# Patient Record
Sex: MALE | Race: OTHER
[De-identification: names, ages, dates, MRNs, and addresses within clinical notes are randomized per-mention and may not be internally consistent; named-entity substitution may affect disease eponyms.]

---

## 2024-05-29 ENCOUNTER — LABORATORY RESULT (OUTPATIENT)
Age: 34
End: 2024-05-29

## 2024-05-29 ENCOUNTER — RESULT REVIEW (OUTPATIENT)
Age: 34
End: 2024-05-29

## 2024-05-29 ENCOUNTER — OUTPATIENT (OUTPATIENT)
Dept: OUTPATIENT SERVICES | Facility: HOSPITAL | Age: 34
LOS: 1 days | End: 2024-05-29
Payer: COMMERCIAL

## 2024-05-29 ENCOUNTER — APPOINTMENT (OUTPATIENT)
Dept: SURGERY | Facility: CLINIC | Age: 34
End: 2024-05-29
Payer: COMMERCIAL

## 2024-05-29 ENCOUNTER — TRANSCRIPTION ENCOUNTER (OUTPATIENT)
Age: 34
End: 2024-05-29

## 2024-05-29 VITALS
HEART RATE: 96 BPM | WEIGHT: 212 LBS | SYSTOLIC BLOOD PRESSURE: 158 MMHG | BODY MASS INDEX: 28.71 KG/M2 | HEIGHT: 72 IN | OXYGEN SATURATION: 98 % | DIASTOLIC BLOOD PRESSURE: 83 MMHG | TEMPERATURE: 98.4 F

## 2024-05-29 DIAGNOSIS — Z80.9 FAMILY HISTORY OF MALIGNANT NEOPLASM, UNSPECIFIED: ICD-10-CM

## 2024-05-29 DIAGNOSIS — Z78.9 OTHER SPECIFIED HEALTH STATUS: ICD-10-CM

## 2024-05-29 DIAGNOSIS — K42.9 UMBILICAL HERNIA W/OUT OBSTRUCTION OR GANGRENE: ICD-10-CM

## 2024-05-29 DIAGNOSIS — I10 ESSENTIAL (PRIMARY) HYPERTENSION: ICD-10-CM

## 2024-05-29 DIAGNOSIS — Z82.49 FAMILY HISTORY OF ISCHEMIC HEART DISEASE AND OTHER DISEASES OF THE CIRCULATORY SYSTEM: ICD-10-CM

## 2024-05-29 PROBLEM — Z00.00 ENCOUNTER FOR PREVENTIVE HEALTH EXAMINATION: Status: ACTIVE | Noted: 2024-05-29

## 2024-05-29 PROCEDURE — 99204 OFFICE O/P NEW MOD 45 MIN: CPT

## 2024-05-29 PROCEDURE — 71046 X-RAY EXAM CHEST 2 VIEWS: CPT | Mod: 26

## 2024-05-29 PROCEDURE — 71046 X-RAY EXAM CHEST 2 VIEWS: CPT

## 2024-05-29 RX ORDER — AMLODIPINE BESYLATE 5 MG/1
5 TABLET ORAL
Refills: 0 | Status: ACTIVE | COMMUNITY

## 2024-05-31 NOTE — ADDENDUM
[FreeTextEntry1] : In summary, Mr. CHUCKIE WALTERS has a symptomatic umbilical hernia for which I recommend surgical repair at the patient's earliest convenience. I discussed the risks and benefits including, but not limited to, injury to intra-abdominal organs, bleeding, infection, use of mesh and recurrent hernia. We discussed the role and complications of mesh at length. I also discussed the normal chi- and post-operative course. I answered all questions about this surgery and possible complications to the best of my ability and the patient verbalized understanding. We discussed the possible need for drains as well. Should unrelenting symptoms or signs of incarceration develop prior to surgery the patient knows to call or go to the emergency room.  Plan for robotic assisted umbilical hernia repair with mesh Pre op EKG, CXR, labs  I, Dr. Darian Price, spent 50 minutes with the patient >50% counseling/coordination of care including, reviewing the patient's history, performing an examination, reviewing relevant labs and radiographic imaging, reviewing PCP and consultant notes, discussion of medical and surgical management of the diagnosis as well as associated risks and benefits, and completing documentation.

## 2024-05-31 NOTE — ASSESSMENT
[FreeTextEntry1] : Pt is a 34 y/o M with pmhx of HTN who presents today feeling well here for evaluation of umbilical hernia. Pt states he first noticed the hernia about 1 year ago and states the hernia has gradually been increasing in size. Pt denies any past sxhx. Denies any smoking, drug use, reports social alcohol use. Pt states she usually is very physically active but has eliminated strength training and he is hoping to return to full exercise capacity. On PE,4cm umbilical hernia appreciated. All of the risks, benefits, and alternatives to the proposed procedure were explained to the pt in great detail and pt wishes to proceed with sx scheduling for umbilical hernia repair.

## 2024-05-31 NOTE — HISTORY OF PRESENT ILLNESS
[de-identified] : Pt is a 34 y/o M with pmhx of HTN who presents today feeling well here for evaluation of umbilical hernia. Pt states he first noticed the hernia about 1 year ago and states the hernia has gradually been increasing in size. Pt denies any past sxhx. Denies any smoking, drug use, reports social alcohol use. Pt states she usually is very physically active but has eliminated strength training and he is hoping to return to full exercise capacity. On PE,4cm umbilical hernia appreciated. All of the risks, benefits, and alternatives to the proposed procedure were explained to the pt in great detail and pt wishes to proceed with sx scheduling for umbilical hernia repair.

## 2024-07-08 ENCOUNTER — TRANSCRIPTION ENCOUNTER (OUTPATIENT)
Age: 34
End: 2024-07-08

## 2024-07-08 VITALS
OXYGEN SATURATION: 97 % | RESPIRATION RATE: 16 BRPM | TEMPERATURE: 98 F | WEIGHT: 210.1 LBS | DIASTOLIC BLOOD PRESSURE: 87 MMHG | HEIGHT: 72 IN | SYSTOLIC BLOOD PRESSURE: 138 MMHG | HEART RATE: 93 BPM

## 2024-07-08 LAB
ALBUMIN SERPL ELPH-MCNC: 4.7 G/DL
ALP BLD-CCNC: 78 U/L
ALT SERPL-CCNC: 21 U/L
ANION GAP SERPL CALC-SCNC: 10 MMOL/L
AST SERPL-CCNC: 27 U/L
BILIRUB SERPL-MCNC: 0.4 MG/DL
BUN SERPL-MCNC: 15 MG/DL
CALCIUM SERPL-MCNC: 9.4 MG/DL
CHLORIDE SERPL-SCNC: 103 MMOL/L
CO2 SERPL-SCNC: 26 MMOL/L
CREAT SERPL-MCNC: 1.02 MG/DL
EGFR: 98 ML/MIN/1.73M2
GLUCOSE SERPL-MCNC: 106 MG/DL
INR PPP: 0.89
POTASSIUM SERPL-SCNC: 4.3 MMOL/L
PROT SERPL-MCNC: 7.1 G/DL
PT BLD: 10.2 SEC
SODIUM SERPL-SCNC: 139 MMOL/L

## 2024-07-08 RX ORDER — CABOTEGRAVIR AND RILPIVIRINE 600-900/3
0 KIT INTRAMUSCULAR
Refills: 0 | DISCHARGE

## 2024-07-08 RX ORDER — AMLODIPINE BESYLATE 2.5 MG/1
1 TABLET ORAL
Refills: 0 | DISCHARGE

## 2024-07-08 NOTE — ASU PATIENT PROFILE, ADULT - FALL HARM RISK - UNIVERSAL INTERVENTIONS
Bed in lowest position, wheels locked, appropriate side rails in place/Call bell, personal items and telephone in reach/Instruct patient to call for assistance before getting out of bed or chair/Non-slip footwear when patient is out of bed/[place] to call system/Physically safe environment - no spills, clutter or unnecessary equipment/Purposeful Proactive Rounding/Room/bathroom lighting operational, light cord in reach

## 2024-07-09 ENCOUNTER — TRANSCRIPTION ENCOUNTER (OUTPATIENT)
Age: 34
End: 2024-07-09

## 2024-07-09 ENCOUNTER — RESULT REVIEW (OUTPATIENT)
Age: 34
End: 2024-07-09

## 2024-07-09 ENCOUNTER — INPATIENT (INPATIENT)
Facility: HOSPITAL | Age: 34
LOS: 0 days | Discharge: HOME | DRG: 354 | End: 2024-07-10
Attending: STUDENT IN AN ORGANIZED HEALTH CARE EDUCATION/TRAINING PROGRAM | Admitting: STUDENT IN AN ORGANIZED HEALTH CARE EDUCATION/TRAINING PROGRAM
Payer: COMMERCIAL

## 2024-07-09 ENCOUNTER — APPOINTMENT (OUTPATIENT)
Dept: BARIATRICS | Facility: HOSPITAL | Age: 34
End: 2024-07-09

## 2024-07-09 DIAGNOSIS — K08.409 PARTIAL LOSS OF TEETH, UNSPECIFIED CAUSE, UNSPECIFIED CLASS: Chronic | ICD-10-CM

## 2024-07-09 PROCEDURE — 49593 RPR AA HRN 1ST 3-10 RDC: CPT

## 2024-07-09 PROCEDURE — 88302 TISSUE EXAM BY PATHOLOGIST: CPT | Mod: 26

## 2024-07-09 PROCEDURE — 93010 ELECTROCARDIOGRAM REPORT: CPT

## 2024-07-09 PROCEDURE — S2900 ROBOTIC SURGICAL SYSTEM: CPT | Mod: NC

## 2024-07-09 PROCEDURE — 49593 RPR AA HRN 1ST 3-10 RDC: CPT | Mod: AS

## 2024-07-09 DEVICE — MESH HERNIA VENTRAL VENTRALIGHT ST ELLIPSE 4 X 6": Type: IMPLANTABLE DEVICE | Status: FUNCTIONAL

## 2024-07-09 RX ORDER — OXYCODONE HYDROCHLORIDE 100 MG/5ML
1 SOLUTION ORAL
Qty: 12 | Refills: 0
Start: 2024-07-09 | End: 2024-07-11

## 2024-07-09 RX ORDER — ACETAMINOPHEN 325 MG
1000 TABLET ORAL EVERY 6 HOURS
Refills: 0 | Status: DISCONTINUED | OUTPATIENT
Start: 2024-07-09 | End: 2024-07-10

## 2024-07-09 RX ORDER — ONDANSETRON HYDROCHLORIDE 2 MG/ML
4 INJECTION INTRAMUSCULAR; INTRAVENOUS EVERY 6 HOURS
Refills: 0 | Status: DISCONTINUED | OUTPATIENT
Start: 2024-07-09 | End: 2024-07-10

## 2024-07-09 RX ORDER — OXYCODONE HYDROCHLORIDE 100 MG/5ML
5 SOLUTION ORAL EVERY 6 HOURS
Refills: 0 | Status: DISCONTINUED | OUTPATIENT
Start: 2024-07-09 | End: 2024-07-10

## 2024-07-09 RX ORDER — HYDROMORPHONE HCL 0.2 MG/ML
0.5 INJECTION, SOLUTION INTRAVENOUS
Refills: 0 | Status: DISCONTINUED | OUTPATIENT
Start: 2024-07-09 | End: 2024-07-09

## 2024-07-09 RX ORDER — DOCUSATE SODIUM 100 MG/1
1 CAPSULE, LIQUID FILLED ORAL
Qty: 14 | Refills: 0
Start: 2024-07-09 | End: 2024-07-15

## 2024-07-09 RX ADMIN — OXYCODONE HYDROCHLORIDE 5 MILLIGRAM(S): 100 SOLUTION ORAL at 19:14

## 2024-07-09 RX ADMIN — HYDROMORPHONE HCL 0.5 MILLIGRAM(S): 0.2 INJECTION, SOLUTION INTRAVENOUS at 17:50

## 2024-07-09 RX ADMIN — HYDROMORPHONE HCL 0.5 MILLIGRAM(S): 0.2 INJECTION, SOLUTION INTRAVENOUS at 17:33

## 2024-07-09 RX ADMIN — Medication 1000 MILLIGRAM(S): at 23:12

## 2024-07-09 RX ADMIN — OXYCODONE HYDROCHLORIDE 5 MILLIGRAM(S): 100 SOLUTION ORAL at 20:14

## 2024-07-09 RX ADMIN — HYDROMORPHONE HCL 0.5 MILLIGRAM(S): 0.2 INJECTION, SOLUTION INTRAVENOUS at 16:56

## 2024-07-09 RX ADMIN — HYDROMORPHONE HCL 0.5 MILLIGRAM(S): 0.2 INJECTION, SOLUTION INTRAVENOUS at 17:29

## 2024-07-09 NOTE — ASU DISCHARGE PLAN (ADULT/PEDIATRIC) - CARE PROVIDER_API CALL
Darian Price  Surgery  [address], [address]  [place] [address]  Phone: [phone]  Fax: [phone]  Follow Up Time:

## 2024-07-09 NOTE — BRIEF OPERATIVE NOTE - NSICDXBRIEFPROCEDURE_GEN_ALL_CORE_FT
PROCEDURES:  Repair, hernia, reducible, abdominal wall, anterior, without history of prior repair, 3 cm to 10 cm, with mesh insertion 09-Jul-2024 16:25:16  Nancy Flores

## 2024-07-09 NOTE — PROGRESS NOTE ADULT - SUBJECTIVE AND OBJECTIVE BOX
General Surgery Post-Op Check    Patient seen and examined without acute complaints. Pt states lower abdominal pain, but controlled with pain medication. Pt with tachycardia and hypertension, which is normalized with administration of pain medication. Pt has voided 300cc. Denies SOB/CP/N/V.     Vital Signs Last 24 Hrs  T(C): 37.6 (09 Jul 2024 19:27), Max: 37.6 (09 Jul 2024 19:27)  T(F): 99.7 (09 Jul 2024 19:27), Max: 99.7 (09 Jul 2024 19:27)  HR: 118 (09 Jul 2024 20:07) (84 - 129)  BP: 155/89 (09 Jul 2024 20:07) (143/82 - 155/89)  BP(mean): 116 (09 Jul 2024 20:07) (103 - 118)  RR: 18 (09 Jul 2024 20:07) (16 - 24)  SpO2: 95% (09 Jul 2024 20:07) (95% - 100%)    Parameters below as of 09 Jul 2024 20:07  Patient On (Oxygen Delivery Method): room air        I&O's Summary    09 Jul 2024 07:01  -  09 Jul 2024 20:44  --------------------------------------------------------  IN: 980 mL / OUT: 500 mL / NET: 480 mL        Physical Exam  Gen: NAD, AAOx3  Pulm: No respiratory distress, no subcostal retractions  CV: RRR, no JVD  Abd: Soft, mildly tender to palpation in RLQ and LLQ, ND, incisions c/d/i  Extremities:  FROM, warm and well perfused, equal bilateral muscle strength      A/P: 33y Male s/p RA umbilical hernia repair    Continue monitoring VS  Discharge           General Surgery Post-Op Check    Patient seen and examined without acute complaints. Pt states lower abdominal pain, but controlled with pain medication. Pt with tachycardia and hypertension, which is normalized with administration of pain medication. Pt has voided 300cc. Denies SOB/CP/N/V.     Vital Signs Last 24 Hrs  T(C): 37.6 (09 Jul 2024 19:27), Max: 37.6 (09 Jul 2024 19:27)  T(F): 99.7 (09 Jul 2024 19:27), Max: 99.7 (09 Jul 2024 19:27)  HR: 118 (09 Jul 2024 20:07) (84 - 129)  BP: 155/89 (09 Jul 2024 20:07) (143/82 - 155/89)  BP(mean): 116 (09 Jul 2024 20:07) (103 - 118)  RR: 18 (09 Jul 2024 20:07) (16 - 24)  SpO2: 95% (09 Jul 2024 20:07) (95% - 100%)    Parameters below as of 09 Jul 2024 20:07  Patient On (Oxygen Delivery Method): room air        I&O's Summary    09 Jul 2024 07:01  -  09 Jul 2024 20:44  --------------------------------------------------------  IN: 980 mL / OUT: 500 mL / NET: 480 mL        Physical Exam  Gen: NAD, AAOx3  Pulm: No respiratory distress, no subcostal retractions  CV: RRR, no JVD  Abd: Soft, mildly tender to palpation in RLQ and LLQ, ND, incisions c/d/i  Extremities:  FROM, warm and well perfused, equal bilateral muscle strength      A/P: 33y Male s/p RA umbilical hernia repair      Admit to 23HR Obs  Regular diet  Pain/Nausea control PRN  SCDs/IS/OOB  Continue monitoring VS

## 2024-07-09 NOTE — ASU DISCHARGE PLAN (ADULT/PEDIATRIC) - ASU DC SPECIAL INSTRUCTIONSFT
Please follow up with Dr. Price in one week; you may call the office to make an appointment at your earliest convenience.    Take 2 tabs tylenol every 6 hours as needed for pain management, do not exceed 4,000mg of tylenol in 24 hours.. You have also been prescribed oxycodone for pain not controlled by tylenol. Take 1 tabs as prescribed for severe pain. Take prescribed stool softener (colace) to prevent constipation caused by oxycodone.     Common side effects of New medications:  - Common side effects of tylenol: nausea, stomach upset, loss of appetite, constipation   - Common side effects of oxycodone include: nausea, constipation, stomach discomfort, drowsiness  - Common side effects of colace: nausea, diarrhea, stomach upset/cramping    General Discharge Instructions:  Please resume all regular home medications unless specifically advised not to take a particular medication. Also, please take any new medications as prescribed.  Please get plenty of rest, continue to ambulate several times per day, and drink adequate amounts of fluids. Avoid lifting weights greater than 5-10 lbs until you follow-up with your surgeon, who will instruct you further regarding activity restrictions.  Avoid driving or operating heavy machinery while taking pain medications.  Please follow-up with your surgeon and Primary Care Provider (PCP) as advised.  Incision Care:  *Please call your doctor if you have increased pain, swelling, redness, or drainage from the incision site.  *Avoid swimming and baths until your follow-up appointment.  *You may shower, and wash surgical incisions with a mild soap and warm water. Gently pat the area dry.    Warning Signs:  Please call your doctor if you experience the following:  *You experience new chest pain, pressure, squeezing or tightness.  *New or worsening cough, shortness of breath, or wheeze.  *If you are vomiting and cannot keep down fluids or your medications.  *You are getting dehydrated due to continued vomiting, diarrhea, or other reasons. Signs of dehydration include dry mouth, rapid heartbeat, or feeling dizzy or faint when standing.  *You see blood or dark/black material when you vomit or have a bowel movement.  *You experience burning when you urinate, have blood in your urine, or experience a discharge.  *Your pain is not improving within 8-12 hours or is not gone within 24 hours. Call or return immediately if your pain is getting worse, changes location, or moves to your chest or back.  *You have shaking chills, or fever greater than 101.5 degrees Fahrenheit or 38 degrees Celsius.  *Any change in your symptoms, or any new symptoms that concern you.

## 2024-07-09 NOTE — BRIEF OPERATIVE NOTE - OPERATION/FINDINGS
Veress insufflation. Robot ports placed along left abdominal wall. Umbilical hernia noted. Peritoneal flap created, hernia reduced. Defect closed with 1 stratafix, 8cm x 12cm ventralight mesh tacked into place with 3-0 vloc, peritioneal flap closed with 3-0 vloc. Robot 12 port site closed with 0 vicryl with endoclose. Abdomen inspected, hemostasis achieved.

## 2024-07-10 ENCOUNTER — TRANSCRIPTION ENCOUNTER (OUTPATIENT)
Age: 34
End: 2024-07-10

## 2024-07-10 VITALS
TEMPERATURE: 98 F | SYSTOLIC BLOOD PRESSURE: 141 MMHG | HEART RATE: 87 BPM | DIASTOLIC BLOOD PRESSURE: 84 MMHG | OXYGEN SATURATION: 96 % | RESPIRATION RATE: 18 BRPM

## 2024-07-10 PROCEDURE — 86850 RBC ANTIBODY SCREEN: CPT

## 2024-07-10 PROCEDURE — C1781: CPT

## 2024-07-10 PROCEDURE — 86900 BLOOD TYPING SEROLOGIC ABO: CPT

## 2024-07-10 PROCEDURE — S2900: CPT

## 2024-07-10 PROCEDURE — 86901 BLOOD TYPING SEROLOGIC RH(D): CPT

## 2024-07-10 PROCEDURE — 93005 ELECTROCARDIOGRAM TRACING: CPT

## 2024-07-10 RX ADMIN — OXYCODONE HYDROCHLORIDE 5 MILLIGRAM(S): 100 SOLUTION ORAL at 01:59

## 2024-07-10 RX ADMIN — OXYCODONE HYDROCHLORIDE 5 MILLIGRAM(S): 100 SOLUTION ORAL at 08:11

## 2024-07-10 RX ADMIN — OXYCODONE HYDROCHLORIDE 5 MILLIGRAM(S): 100 SOLUTION ORAL at 09:10

## 2024-07-10 RX ADMIN — Medication 1000 MILLIGRAM(S): at 00:12

## 2024-07-10 NOTE — PROGRESS NOTE ADULT - ASSESSMENT
33M with pmhx of HTN, HIV and no pshx now s/p Robotic assisted lap umbilical hernia repair    23hr obs  reg diet  pain/nausea control PRN  SCDs/IS/OOB

## 2024-07-10 NOTE — DISCHARGE NOTE NURSING/CASE MANAGEMENT/SOCIAL WORK - NSDCPEFALRISK_GEN_ALL_CORE
For information on Fall & Injury Prevention, visit: [email or web address] OR  [email or web address] OR  https://www.cdc.gov/steadi/patient.html

## 2024-07-10 NOTE — PATIENT PROFILE ADULT - FUNCTIONAL ASSESSMENT - BASIC MOBILITY 1.
Spoke with Jose regards to their up coming procedure scheduled for 8/17. [facility] 1300. He will remain NPO after midnight the night before the procedure and will take morning meds with small sip of water. He will need to hold metformin pre procedure.    I also informed them of the change in the visitor policy and that one person is allowed to come with them. I asked that they and their visitor wear a mask to the hospital and if they do not have one, one will be given to them at the front door. They are also aware that they and their visitor will be screened for Covid upon arrival at the hospital. He will be given conscious sedation for the procedure and therefore will need to have a . No further questions at this time.Gabe Guthrie RN       4 = No assist / stand by assistance

## 2024-07-10 NOTE — DISCHARGE NOTE NURSING/CASE MANAGEMENT/SOCIAL WORK - PATIENT PORTAL LINK FT
You can access the FollowMyHealth Patient Portal offered by [facility] by registering at the following website: [email or web address]. By joining [facility]’s FollowMyHealth portal, you will also be able to view your health information using other applications (apps) compatible with our system.

## 2024-07-10 NOTE — PROGRESS NOTE ADULT - SUBJECTIVE AND OBJECTIVE BOX
INTERVAL HPI/OVERNIGHT EVENTS: Admitted for 23 hr obs, 11:30PM VS /93, , asx  7/9: RA umbilical hernia repair, POC still hypertensive and tachycardic, normalizes with pain meds to 120-130s, hr 115, pTOV    STATUS POST:  Robotic assisted  laparoscopic  umbilical hernia repair        POST OPERATIVE DAY #: 1    SUBJECTIVE: Patient examined bedside with chief resident. Patient complains of incisional pain which is alleviated with Tylenol. Patient reports that he is tolerating diet. Patient reports he is ambulating and using incentive spirometer . Patient denies SOB, chest pain , nausea and emesis. Patient making adequate urine output.           Vital Signs Last 24 Hrs  T(C): 36.6 (10 Jul 2024 08:05), Max: 37.6 (09 Jul 2024 19:27)  T(F): 97.9 (10 Jul 2024 08:05), Max: 99.7 (09 Jul 2024 19:27)  HR: 87 (10 Jul 2024 08:05) (75 - 129)  BP: 141/84 (10 Jul 2024 08:05) (117/80 - 162/93)  BP(mean): 107 (09 Jul 2024 22:40) (99 - 118)  RR: 18 (10 Jul 2024 08:05) (12 - 24)  SpO2: 96% (10 Jul 2024 08:05) (94% - 100%)    Parameters below as of 10 Jul 2024 08:05  Patient On (Oxygen Delivery Method): room air      I&O's Detail    09 Jul 2024 07:01  -  10 Jul 2024 07:00  --------------------------------------------------------  IN:    Oral Fluid: 1640 mL  Total IN: 1640 mL    OUT:    Voided (mL): 1550 mL  Total OUT: 1550 mL    Total NET: 90 mL      10 Jul 2024 07:01  -  10 Jul 2024 10:23  --------------------------------------------------------  IN:    Oral Fluid: 300 mL  Total IN: 300 mL    OUT:    Voided (mL): 400 mL  Total OUT: 400 mL    Total NET: -100 mL          General: NAD, resting comfortably in bed  C/V: NSR  Pulm: Nonlabored breathing, no respiratory distress  Abd: Soft, non-distended, TTP around incision site, incision clean dry and intact.   Extrem: WWP, no edema, SCDs in place

## 2024-07-15 LAB — SURGICAL PATHOLOGY STUDY: SIGNIFICANT CHANGE UP

## 2024-07-16 DIAGNOSIS — K42.9 UMBILICAL HERNIA WITHOUT OBSTRUCTION OR GANGRENE: ICD-10-CM

## 2024-07-16 DIAGNOSIS — I10 ESSENTIAL (PRIMARY) HYPERTENSION: ICD-10-CM

## 2024-07-16 DIAGNOSIS — B20 HUMAN IMMUNODEFICIENCY VIRUS [HIV] DISEASE: ICD-10-CM

## 2024-07-24 ENCOUNTER — APPOINTMENT (OUTPATIENT)
Dept: BARIATRICS | Facility: CLINIC | Age: 34
End: 2024-07-24
Payer: COMMERCIAL

## 2024-07-24 VITALS
HEIGHT: 72 IN | DIASTOLIC BLOOD PRESSURE: 89 MMHG | SYSTOLIC BLOOD PRESSURE: 138 MMHG | OXYGEN SATURATION: 97 % | WEIGHT: 213 LBS | TEMPERATURE: 98.2 F | HEART RATE: 94 BPM | BODY MASS INDEX: 28.85 KG/M2

## 2024-07-24 DIAGNOSIS — Z78.9 OTHER SPECIFIED HEALTH STATUS: ICD-10-CM

## 2024-07-24 DIAGNOSIS — I10 ESSENTIAL (PRIMARY) HYPERTENSION: ICD-10-CM

## 2024-07-24 DIAGNOSIS — Z82.49 FAMILY HISTORY OF ISCHEMIC HEART DISEASE AND OTHER DISEASES OF THE CIRCULATORY SYSTEM: ICD-10-CM

## 2024-07-24 DIAGNOSIS — Z80.9 FAMILY HISTORY OF MALIGNANT NEOPLASM, UNSPECIFIED: ICD-10-CM

## 2024-07-24 PROCEDURE — 99213 OFFICE O/P EST LOW 20 MIN: CPT

## 2024-07-26 NOTE — ASSESSMENT
[FreeTextEntry1] : Patient is a 33 year old gentleman with history of HTN who presents today after undergoing robotic assisted umbilical hernia repair with mesh. Doing well overall, pain resolved, tolerating diet, having appropriate bowel function, ambulating. Denies fevers, chills, chest pain, dyspnea, dysuria, hematochezia, melena.   Doing well overall Tolerating diet Having bowel function Ambulation No heavy lifting Activity restrictions x 6 weeks  I, Dr. Darian Price, spent 25 minutes with the patient >50% counseling/coordination of care including, reviewing the patient's history, performing an examination, reviewing relevant labs and radiographic imaging, reviewing PCP and consultant notes, discussion of medical and surgical management of the diagnosis as well as associated risks and benefits, and completing documentation.

## 2024-11-12 NOTE — BRIEF OPERATIVE NOTE - FIRST ASSIST NAME
.      Mr. Odin Bryan is a 69 year old male w/ pmhx of prostate ca found to have diffuse metastatic bone disease.    NM scan showing widespread skeletal mets.  IR consulted for bx on 11/9.        --IR will plan for ct guided right posterior illiac bone bx with anesthesia on Thursday 11/14  --please keep patient npo starting 11/13 at 1155pm.   --hold ac/ap x 24hrs, hold subq lovenox  x 24hr prior to procedure  --Imaging reviewed w/Dr. Wilcox (IR Attending)  --stat labs cbc bmp coags on AM of 11/14      Sadaf Jalili, IR PA-C, available on TEAMS or IR callback [phone] Nancy Flores (Physician Assistant)

## 2025-03-03 NOTE — ASU PREOP CHECKLIST - NSBLOODTRANS_GEN_A_CORE_SIUH
no... endorsing "wanting to drive off a highway into a wall" to his counselor, sent in by counselor for psych eval. Denies HI/AVH. Denies drug and alcohol use. Calm and cooperative. Hx of depression compliant with med.

## (undated) DEVICE — TROCAR SURGIQUEST AIRSEAL 12MMX100MM

## (undated) DEVICE — TROCAR SURGIQUEST AIRSEAL 8MMX100MM

## (undated) DEVICE — D HELP - CLEARVIEW CLEARIFY SYSTEM

## (undated) DEVICE — XI ARM SCISSOR MONO CURVED

## (undated) DEVICE — SUT VICRYL 0 27" UR-6

## (undated) DEVICE — XI DRAPE COLUMN

## (undated) DEVICE — XI DRAPE ARM

## (undated) DEVICE — SUT PDO 0 1/2 CIRCLE 26MM NDL 20CM

## (undated) DEVICE — TROCAR COVIDIEN VERSAONE FIXATION CANNULA 5MM

## (undated) DEVICE — XI ENDOWRIST 12 - 8 MM CANNULA REDUCER

## (undated) DEVICE — TUBING AIRSEAL TRI-LUMEN FILTERED

## (undated) DEVICE — XI OBTURATOR OPTICAL BLADELESS 8MM

## (undated) DEVICE — SUT VLOC 180 0 12" GS-21 GREEN

## (undated) DEVICE — TIP METZENBAUM SCISSOR MONOPOLAR ENDOCUT (ORANGE)

## (undated) DEVICE — XI TIP COVER

## (undated) DEVICE — SUT VLOC 180 2-0 9" GS-22 GREEN

## (undated) DEVICE — DRSG DERMABOND 0.7ML

## (undated) DEVICE — TROCAR COVIDIEN VERSAPORT BLADELESS OPTICAL 12MM STANDARD

## (undated) DEVICE — TROCAR COVIDIEN VERSAPORT BLADELESS OPTICAL 5MM STANDARD

## (undated) DEVICE — INSUFFLATION NDL COVIDIEN SURGINEEDLE VERESS 150MM LONG

## (undated) DEVICE — ELCTR BOVIE PENCIL BLADE 10FT

## (undated) DEVICE — Device

## (undated) DEVICE — XI ARM FORCEP CADIERE 8MM

## (undated) DEVICE — VENODYNE/SCD SLEEVE CALF MEDIUM

## (undated) DEVICE — GLV 7.5 PROTEXIS (WHITE)

## (undated) DEVICE — XI SEAL UNIVERSIAL 5-12MM

## (undated) DEVICE — XI ARM FORCEP FENESTRATED BIPOLAR 8MM

## (undated) DEVICE — SUT MONOCRYL 4-0 27" PS-2 UNDYED

## (undated) DEVICE — PACK GENERAL LAPAROSCOPY

## (undated) DEVICE — BLADE SURGICAL #15 CARBON

## (undated) DEVICE — WARMING BLANKET LOWER ADULT

## (undated) DEVICE — TUBING W FILTER STERILE FOR INSUFFLATION